# Patient Record
Sex: MALE | Race: WHITE | URBAN - METROPOLITAN AREA
[De-identification: names, ages, dates, MRNs, and addresses within clinical notes are randomized per-mention and may not be internally consistent; named-entity substitution may affect disease eponyms.]

---

## 2017-01-10 ENCOUNTER — OFFICE VISIT (OUTPATIENT)
Dept: INTERNAL MEDICINE CLINIC | Facility: CLINIC | Age: 32
End: 2017-01-10

## 2017-01-10 VITALS
TEMPERATURE: 99 F | OXYGEN SATURATION: 99 % | HEIGHT: 68.5 IN | RESPIRATION RATE: 17 BRPM | HEART RATE: 114 BPM | BODY MASS INDEX: 25.02 KG/M2 | WEIGHT: 167 LBS | SYSTOLIC BLOOD PRESSURE: 136 MMHG | DIASTOLIC BLOOD PRESSURE: 70 MMHG

## 2017-01-10 DIAGNOSIS — J02.0 STREP SORE THROAT: Primary | ICD-10-CM

## 2017-01-10 LAB — CONTROL LINE PRESENT WITH A CLEAR BACKGROUND (YES/NO): PRESENT YES/NO

## 2017-01-10 PROCEDURE — 99213 OFFICE O/P EST LOW 20 MIN: CPT | Performed by: INTERNAL MEDICINE

## 2017-01-10 PROCEDURE — 87880 STREP A ASSAY W/OPTIC: CPT | Performed by: INTERNAL MEDICINE

## 2017-01-10 PROCEDURE — 99000 SPECIMEN HANDLING OFFICE-LAB: CPT | Performed by: INTERNAL MEDICINE

## 2017-01-10 RX ORDER — AMOXICILLIN AND CLAVULANATE POTASSIUM 875; 125 MG/1; MG/1
1 TABLET, FILM COATED ORAL 2 TIMES DAILY
Qty: 14 TABLET | Refills: 0 | Status: SHIPPED | OUTPATIENT
Start: 2017-01-10 | End: 2017-01-20

## 2017-01-10 RX ORDER — IBUPROFEN 600 MG/1
600 TABLET ORAL EVERY 6 HOURS PRN
Qty: 40 TABLET | Refills: 0 | Status: SHIPPED | OUTPATIENT
Start: 2017-01-10 | End: 2017-01-23 | Stop reason: ALTCHOICE

## 2017-01-10 RX ORDER — MULTIVIT-MIN/FOLIC/VIT K/LYCOP 400-300MCG
TABLET ORAL
COMMUNITY

## 2017-01-10 NOTE — PROGRESS NOTES
HPI:    Patient ID: Peter Nicholson is a 33year old male. HPI   Patient was previously healthy until yesterday when he started to have chills, fever, generalized body ache, weakness, nasal congestion, headaches, soreness of throat.  He felt  Sorenes Effort normal and breath sounds normal. He has no wheezes. Abdominal: Soft. Bowel sounds are normal. He exhibits no mass. There is no tenderness. Musculoskeletal: He exhibits no edema. Lymphadenopathy:     He has no cervical adenopathy.    Neurologica

## 2017-01-23 ENCOUNTER — OFFICE VISIT (OUTPATIENT)
Dept: INTERNAL MEDICINE CLINIC | Facility: CLINIC | Age: 32
End: 2017-01-23

## 2017-01-23 VITALS
HEART RATE: 99 BPM | BODY MASS INDEX: 24.57 KG/M2 | HEIGHT: 68.5 IN | SYSTOLIC BLOOD PRESSURE: 130 MMHG | OXYGEN SATURATION: 99 % | TEMPERATURE: 98 F | RESPIRATION RATE: 17 BRPM | DIASTOLIC BLOOD PRESSURE: 60 MMHG | WEIGHT: 164 LBS

## 2017-01-23 DIAGNOSIS — J03.91 RECURRENT TONSILLITIS: Primary | ICD-10-CM

## 2017-01-23 PROCEDURE — 99213 OFFICE O/P EST LOW 20 MIN: CPT | Performed by: INTERNAL MEDICINE

## 2017-01-23 RX ORDER — AMOXICILLIN AND CLAVULANATE POTASSIUM 875; 125 MG/1; MG/1
1 TABLET, FILM COATED ORAL 2 TIMES DAILY
Qty: 20 TABLET | Refills: 0 | Status: SHIPPED | OUTPATIENT
Start: 2017-01-23 | End: 2017-01-26 | Stop reason: ALTCHOICE

## 2017-01-24 NOTE — PROGRESS NOTES
HPI:    Patient ID: Michael Uribe is a 33year old male. HPI     Patient is here for f/u Strep throat. He is feeling much better. No longer have sore throat. No painful swelling. He report h/o recurrent sore throat averaging 5 x yearly. . Non-smoke INTERNAL       CT#[id number]

## 2017-01-26 ENCOUNTER — OFFICE VISIT (OUTPATIENT)
Dept: OTOLARYNGOLOGY | Facility: CLINIC | Age: 32
End: 2017-01-26

## 2017-01-26 VITALS
TEMPERATURE: 98 F | WEIGHT: 164 LBS | SYSTOLIC BLOOD PRESSURE: 106 MMHG | HEIGHT: 68.5 IN | BODY MASS INDEX: 24.57 KG/M2 | DIASTOLIC BLOOD PRESSURE: 70 MMHG

## 2017-01-26 DIAGNOSIS — R07.0 THROAT PAIN IN ADULT: Primary | ICD-10-CM

## 2017-01-26 PROCEDURE — 99243 OFF/OP CNSLTJ NEW/EST LOW 30: CPT | Performed by: OTOLARYNGOLOGY

## 2017-01-26 PROCEDURE — 99212 OFFICE O/P EST SF 10 MIN: CPT | Performed by: OTOLARYNGOLOGY

## 2017-01-26 RX ORDER — FLUTICASONE PROPIONATE 50 MCG
1 SPRAY, SUSPENSION (ML) NASAL 2 TIMES DAILY
Qty: 1 BOTTLE | Refills: 3 | Status: SHIPPED | OUTPATIENT
Start: 2017-01-26

## 2017-01-26 RX ORDER — MONTELUKAST SODIUM 10 MG/1
10 TABLET ORAL NIGHTLY
Qty: 30 TABLET | Refills: 3 | Status: SHIPPED | OUTPATIENT
Start: 2017-01-26

## 2017-01-26 NOTE — PROGRESS NOTES
Cecilia Borja is a 33year old male. Patient presents with: Tonsil Problem: recurrent tonsillitis, 6x in a year      HISTORY OF PRESENT ILLNESS  He presents with a history of chronic throat pain which he states occurred only in the past year.  He do Constitutional Normal Overall appearance - Normal.   Psychiatric Normal Orientation - Oriented to time, place, person & situation. Appropriate mood and affect.    Neck Exam Normal Inspection - Normal. Palpation - Normal. Parotid gland - Normal. Thyroid gl the middle of his throat just above his voice box. Unclear if these are truly tonsil infections. He does complain of postnasal discharge. Start loratadine D., Singulair and fluticasone and return to clinic in one month.  If no better we will discuss possibl

## 2017-03-23 ENCOUNTER — OFFICE VISIT (OUTPATIENT)
Dept: OTOLARYNGOLOGY | Facility: CLINIC | Age: 32
End: 2017-03-23

## 2017-03-23 VITALS
WEIGHT: 160 LBS | SYSTOLIC BLOOD PRESSURE: 138 MMHG | DIASTOLIC BLOOD PRESSURE: 78 MMHG | TEMPERATURE: 99 F | HEIGHT: 69 IN | BODY MASS INDEX: 23.7 KG/M2

## 2017-03-23 DIAGNOSIS — J03.01 ACUTE RECURRENT STREPTOCOCCAL TONSILLITIS: Primary | ICD-10-CM

## 2017-03-23 PROCEDURE — 99214 OFFICE O/P EST MOD 30 MIN: CPT | Performed by: OTOLARYNGOLOGY

## 2017-03-23 PROCEDURE — 99212 OFFICE O/P EST SF 10 MIN: CPT | Performed by: OTOLARYNGOLOGY

## 2017-03-23 RX ORDER — METHYLPREDNISOLONE 4 MG/1
TABLET ORAL
Qty: 1 PACKAGE | Refills: 0 | Status: SHIPPED | OUTPATIENT
Start: 2017-03-23

## 2017-03-23 RX ORDER — AMOXICILLIN AND CLAVULANATE POTASSIUM 875; 125 MG/1; MG/1
1 TABLET, FILM COATED ORAL 2 TIMES DAILY
COMMUNITY

## 2017-03-23 RX ORDER — IBUPROFEN 600 MG/1
600 TABLET ORAL EVERY 6 HOURS PRN
COMMUNITY

## 2017-03-23 RX ORDER — AMOXICILLIN AND CLAVULANATE POTASSIUM 875; 125 MG/1; MG/1
1 TABLET, FILM COATED ORAL EVERY 12 HOURS
Qty: 20 TABLET | Refills: 0 | Status: SHIPPED | OUTPATIENT
Start: 2017-03-23

## 2018-04-08 ENCOUNTER — OFFICE VISIT (OUTPATIENT)
Dept: FAMILY MEDICINE CLINIC | Facility: CLINIC | Age: 33
End: 2018-04-08

## 2018-04-08 VITALS
OXYGEN SATURATION: 98 % | RESPIRATION RATE: 16 BRPM | DIASTOLIC BLOOD PRESSURE: 70 MMHG | SYSTOLIC BLOOD PRESSURE: 138 MMHG | TEMPERATURE: 100 F | HEART RATE: 102 BPM

## 2018-04-08 DIAGNOSIS — J02.9 SORE THROAT: ICD-10-CM

## 2018-04-08 DIAGNOSIS — J02.0 STREP PHARYNGITIS: Primary | ICD-10-CM

## 2018-04-08 PROCEDURE — 87880 STREP A ASSAY W/OPTIC: CPT | Performed by: PHYSICIAN ASSISTANT

## 2018-04-08 PROCEDURE — 99202 OFFICE O/P NEW SF 15 MIN: CPT | Performed by: PHYSICIAN ASSISTANT

## 2018-04-08 RX ORDER — AMOXICILLIN 875 MG/1
875 TABLET, COATED ORAL 2 TIMES DAILY
Qty: 20 TABLET | Refills: 0 | Status: SHIPPED | OUTPATIENT
Start: 2018-04-08 | End: 2018-04-18

## 2018-04-08 NOTE — PATIENT INSTRUCTIONS
Faringitis Por Estreptococos [Pharyngitis, Strep - Confirmed]    Forrester prueba raf positiva a la infección por estreptococos. Se trata de chelsea enfermedad contagiosa.  La puede contagiar al toser, al besar o al tocar a otras personas después de haberse tocado l · Niños: Use acetaminofén (Tylenol) para [employer] fiebre, el nerviosismo y el malestar.  En niños mayores de seis meses puede usar ibuprofeno (colt Motrin infantil) en vez de Tylenol.  [NOTA: Si el samuel tiene chelsea enfermedad hepática o renal crónica, o ha © 1698-6261 [employer] [address]. [address] [facility] [place], [address] [facility]. Todos los derechos reservados. Esta información no pretende sustituir la atención médica profesional. Sólo nguyen médico puede diagnosticar y tratar un problema de neel.
